# Patient Record
Sex: MALE | Employment: PART TIME | URBAN - METROPOLITAN AREA
[De-identification: names, ages, dates, MRNs, and addresses within clinical notes are randomized per-mention and may not be internally consistent; named-entity substitution may affect disease eponyms.]

---

## 2019-11-19 VITALS
SYSTOLIC BLOOD PRESSURE: 134 MMHG | OXYGEN SATURATION: 96 % | TEMPERATURE: 97.4 F | WEIGHT: 183 LBS | RESPIRATION RATE: 18 BRPM | DIASTOLIC BLOOD PRESSURE: 74 MMHG

## 2019-11-19 PROCEDURE — 99283 EMERGENCY DEPT VISIT LOW MDM: CPT

## 2019-11-20 ENCOUNTER — HOSPITAL ENCOUNTER (EMERGENCY)
Facility: CLINIC | Age: 19
Discharge: HOME | End: 2019-11-20
Attending: EMERGENCY MEDICINE | Admitting: EMERGENCY MEDICINE
Payer: COMMERCIAL

## 2019-11-20 DIAGNOSIS — T78.40XA ALLERGIC REACTION, INITIAL ENCOUNTER: ICD-10-CM

## 2019-11-20 PROCEDURE — 25000132 ZZH RX MED GY IP 250 OP 250 PS 637: Performed by: EMERGENCY MEDICINE

## 2019-11-20 PROCEDURE — 25000131 ZZH RX MED GY IP 250 OP 636 PS 637: Performed by: EMERGENCY MEDICINE

## 2019-11-20 RX ORDER — DIPHENHYDRAMINE HCL 25 MG
50 CAPSULE ORAL ONCE
Status: COMPLETED | OUTPATIENT
Start: 2019-11-20 | End: 2019-11-20

## 2019-11-20 RX ORDER — PREDNISONE 20 MG/1
40 TABLET ORAL ONCE
Status: COMPLETED | OUTPATIENT
Start: 2019-11-20 | End: 2019-11-20

## 2019-11-20 RX ORDER — PREDNISONE 20 MG/1
TABLET ORAL
Qty: 4 TABLET | Refills: 0 | Status: SHIPPED | OUTPATIENT
Start: 2019-11-20 | End: 2019-11-22

## 2019-11-20 RX ADMIN — PREDNISONE 40 MG: 20 TABLET ORAL at 01:22

## 2019-11-20 RX ADMIN — DIPHENHYDRAMINE HYDROCHLORIDE 50 MG: 25 CAPSULE ORAL at 01:22

## 2019-11-20 ASSESSMENT — ENCOUNTER SYMPTOMS
SHORTNESS OF BREATH: 0
COLOR CHANGE: 1
TROUBLE SWALLOWING: 0

## 2019-11-20 NOTE — ED AVS SNAPSHOT
Emergency Department  [address] [place] [address]  Phone:  [phone]  Fax:  [phone]                                    Kaiden Joe   MRN: [id number]    Department:   Emergency Department   Date of Visit:  11/19/2019           After Visit Summary Signature Page    I have received my discharge instructions, and my questions have been answered. I have discussed any challenges I see with this plan with the nurse or doctor.    ..........................................................................................................................................  Patient/Patient Representative Signature      ..........................................................................................................................................  Patient Representative Print Name and Relationship to Patient    ..................................................               ................................................  Date                                   Time    ..........................................................................................................................................  Reviewed by Signature/Title    ...................................................              ..............................................  Date                                               Time          22EPIC Rev 08/18

## 2019-11-20 NOTE — ED TRIAGE NOTES
"Works as a , says that he was at work when his hands began to swell and itch for no apparent reason. Symptoms have since improved and now feels somewhat \"tight\" with mild itching. Does not remember coming in contact with anything specific  "

## 2019-11-20 NOTE — ED PROVIDER NOTES
History     Chief Complaint:  Allergic Reaction     HPI  Kaiden Joe is a 19 year old male who presents to the emergency department today with an allergic reaction. 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 He states he had no new exposure at home and it started about 3-4 hours into his shift. He states he only walked by patient's rooms, did not touch anything or wear gloves. He states he tried washing his hands but that did not work. He states he called the nursing line who told him to present to the ED. He states he still has some mild symptoms here in the ED. He denies trouble breathing, swallowing, other rashes or chest itchiness.     Allergies:  Amoxicillin     Medications:    The patient is not currently taking any prescribed medications.    Past Medical History:    Overweight  Bilateral pes planus     Past Surgical History:    History reviewed. No pertinent surgical history.     Family History:    Father - diabetes  Brother - epilepsy    Social History:  The patient was accompanied to the ED with his mother.  Smoking Status: Never  Smokeless Tobacco: Never  Alcohol Use: No     Review of Systems   HENT: Negative for trouble swallowing.    Respiratory: Negative for shortness of breath.    Skin: Positive for color change and rash.   All other systems reviewed and are negative.         Physical Exam     Patient Vitals for the past 24 hrs:   BP Temp Heart Rate Resp SpO2 Weight   11/19/19 2308 134/74 97.4  F (36.3  C) 74 18 96 % 83 kg (183 lb)        Physical Exam  General: Patient is alert and normal appearing.  HEENT: Head atraumatic    Eyes: pupils equal and reactive. Conjunctiva clear   Nares: patent   Oropharynx: no lesions, uvula midline, no palatal draping, normal voice, no trismus, no angioedema  Neck: Supple without lymphadenopathy, no meningismus  Chest: Heart regular rate and rhythm.   Lungs: Equal clear to  auscultation with no wheeze or rales  Abdomen: Soft, non tender, nondistended, normal bowel sounds  Back: No costovertebral angle tenderness, no midline C, T or L spine tenderness  Neuro: Grossly nonfocal, normal speech, strength equal bilaterally, CN 2-12 intact  Extremities: No deformities, equal radial and DP pulses. No clubbing, cyanosis.  No edema  Skin: Warm and dry with no rash.  Minimal erythema noted to his bilateral hands with no edema, cap refill less than 2 seconds, no obvious urticaria      Emergency Department Course     Interventions:  0122 Deltasone 40 mg PO  0122 Benadryl 50 mg PO    Emergency Department Course:  Nursing notes and vitals reviewed. (1:08 AM) I performed an exam of the patient as documented above.     Medicine administered as documented above.    Findings and plan explained to the Patient. Patient discharged home with instructions regarding supportive care, medications, and reasons to return. The importance of close follow-up was reviewed. The patient was prescribed Deltasone.     Impression & Plan       Medical Decision Making:  Kaiden Joe is a 19 year old male who presents for evaluation of rash.  This is likely consistent with allergic phenomena.  This appears to be at this point an isolated rash without signs of angioedema, respiratory compromise, shock, serious systemic reaction, etc.  He looks overall  well here initially and after observation with detailed physical exam and history to look for a more serious allergic reaction/anaphylaxis.  No signs of serious infection, cellulitis, vasculitis, or other skin manifestation of a serious underlying disease.  Supportive outpatient management is indicated, medications for discharge noted above.  Close followup with primary care physician.  Return if  wheezing, progressive shortness of breath, facial or throat/tongue swelling.          Diagnosis:    ICD-10-CM    1. Allergic reaction, initial encounter T78.40XA        Disposition:  Discharged to home.    Discharge Medications:  Discharge Medication List as of 11/20/2019  1:37 AM      START taking these medications    Details   predniSONE (DELTASONE) 20 MG tablet Take two tablets (= 40mg) each day for 5 (five) days, Disp-4 tablet, R-0, Local Print            Scribe Disclosure:  Claudy HERCULES, am serving as a scribe at 1:08 AM on 11/20/2019 to document services personally performed by Kacie Bella MD based on my observations and the provider's statements to me.      11/20/2019    EMERGENCY DEPARTMENT       Kacie Bella MD  11/20/19 0414

## 2022-07-03 NOTE — ED NOTES
-Pt non-mobile for some time in hospital, so PT/OT order placed     Bed: ED30  Expected date:   Expected time:   Means of arrival:   Comments:  Ready